# Patient Record
Sex: FEMALE | Race: WHITE | NOT HISPANIC OR LATINO | URBAN - METROPOLITAN AREA
[De-identification: names, ages, dates, MRNs, and addresses within clinical notes are randomized per-mention and may not be internally consistent; named-entity substitution may affect disease eponyms.]

---

## 2024-01-11 ENCOUNTER — OFFICE VISIT (OUTPATIENT)
Dept: OPHTHALMOLOGY | Facility: CLINIC | Age: 69
End: 2024-01-11
Payer: COMMERCIAL

## 2024-01-11 DIAGNOSIS — H52.4 PRESBYOPIA: ICD-10-CM

## 2024-01-11 DIAGNOSIS — H25.13 NUCLEAR SCLEROTIC CATARACT OF BOTH EYES: ICD-10-CM

## 2024-01-11 DIAGNOSIS — H52.13 MYOPIA OF BOTH EYES: Primary | ICD-10-CM

## 2024-01-11 DIAGNOSIS — H52.223 REGULAR ASTIGMATISM OF BOTH EYES: ICD-10-CM

## 2024-01-11 PROCEDURE — 92014 COMPRE OPH EXAM EST PT 1/>: CPT | Performed by: OPTOMETRIST

## 2024-01-11 PROCEDURE — 92015 DETERMINE REFRACTIVE STATE: CPT | Performed by: OPTOMETRIST

## 2024-01-11 PROCEDURE — FLVLF CONTACT LENS EVALUATION (SP): Performed by: OPTOMETRIST

## 2024-01-11 RX ORDER — LOSARTAN POTASSIUM 25 MG/1
25 TABLET ORAL 2 TIMES DAILY
COMMUNITY
Start: 2023-12-27

## 2024-01-11 RX ORDER — ROSUVASTATIN CALCIUM 20 MG/1
20 TABLET, COATED ORAL DAILY
COMMUNITY
Start: 2023-11-05

## 2024-01-11 ASSESSMENT — ENCOUNTER SYMPTOMS
MUSCULOSKELETAL NEGATIVE: 0
ALLERGIC/IMMUNOLOGIC NEGATIVE: 0
CONSTITUTIONAL NEGATIVE: 0
NEUROLOGICAL NEGATIVE: 0
ENDOCRINE NEGATIVE: 0
CARDIOVASCULAR NEGATIVE: 1
EYES NEGATIVE: 0
GASTROINTESTINAL NEGATIVE: 0
RESPIRATORY NEGATIVE: 0
PSYCHIATRIC NEGATIVE: 0
HEMATOLOGIC/LYMPHATIC NEGATIVE: 0

## 2024-01-11 ASSESSMENT — REFRACTION_MANIFEST
OD_SPHERE: -1.00
OD_CYLINDER: -0.50
OS_CYLINDER: -0.75
OS_SPHERE: -1.25
OD_AXIS: 100
OS_ADD: +2.50
OD_ADD: +2.50
OS_AXIS: 110

## 2024-01-11 ASSESSMENT — REFRACTION_WEARINGRX
OS_AXIS: 105
OS_SPHERE: -1.00
OD_SPHERE: -1.50
OD_AXIS: 095
OD_CYLINDER: -0.25
OS_CYLINDER: -0.75
OD_ADD: +2.50

## 2024-01-11 ASSESSMENT — CONF VISUAL FIELD
OD_NORMAL: 1
OS_NORMAL: 1
OS_INFERIOR_TEMPORAL_RESTRICTION: 0
METHOD: COUNTING FINGERS
OS_INFERIOR_NASAL_RESTRICTION: 0
OD_INFERIOR_TEMPORAL_RESTRICTION: 0
OD_INFERIOR_NASAL_RESTRICTION: 0
OS_SUPERIOR_NASAL_RESTRICTION: 0
OD_SUPERIOR_NASAL_RESTRICTION: 0
OS_SUPERIOR_TEMPORAL_RESTRICTION: 0
OD_SUPERIOR_TEMPORAL_RESTRICTION: 0

## 2024-01-11 ASSESSMENT — VISUAL ACUITY
OD_CC: 20/20
OS_CC: CF
VA_OR_OD_CURRENT_RX: 20/20
CORRECTION_TYPE: CONTACTS
VA_OR_OS_CURRENT_RX: 20/30
METHOD: SNELLEN - LINEAR
OS_CC: 20/25

## 2024-01-11 ASSESSMENT — TONOMETRY
OD_IOP_MMHG: 18
OS_IOP_MMHG: 18
IOP_METHOD: GOLDMANN APPLANATION

## 2024-01-11 ASSESSMENT — SLIT LAMP EXAM - LIDS
COMMENTS: GOOD POSITION
COMMENTS: GOOD POSITION

## 2024-01-11 ASSESSMENT — REFRACTION_CURRENTRX
OS_BASECURVE: 8.5
OS_SPHERE: +0.50
OS_BRAND: DAILIES TOTAL 1
OD_BASECURVE: 8.5
OD_BRAND: DAILIES TOTAL 1
OS_CYLINDER: SPHERE
OD_SPHERE: -1.50
OD_CYLINDER: SPHERE
OD_DIAMETER: 14.1
OS_DIAMETER: 14.1

## 2024-01-11 ASSESSMENT — CUP TO DISC RATIO
OD_RATIO: .3
OS_RATIO: .3

## 2024-01-11 ASSESSMENT — EXTERNAL EXAM - LEFT EYE: OS_EXAM: NORMAL

## 2024-01-11 ASSESSMENT — EXTERNAL EXAM - RIGHT EYE: OD_EXAM: NORMAL

## 2024-01-11 NOTE — PROGRESS NOTES
Assessment/Plan   Diagnoses and all orders for this visit:  Myopia of both eyes  Presbyopia  Regular astigmatism of both eyes  New spec rx released today per patient request. Ocular health wnl for age OU. Monitor 1 year or sooner prn. Refraction billed today. Discussed proper wear, care, replacement of contact lenses. Gave handout. D/c cl wear and RTC if eyes become red, painful, irritated. Monitor 1 year.   CL eval billed today.  $35  Baseline OCT RNFL at next visit.     Nuclear sclerotic cataract of both eyes  Patient's cataracts are not visually significant. Will monitor for changes. No indication for surgery at this time.

## 2025-05-02 ENCOUNTER — APPOINTMENT (OUTPATIENT)
Dept: OPHTHALMOLOGY | Facility: CLINIC | Age: 70
End: 2025-05-02
Payer: COMMERCIAL

## 2025-05-02 DIAGNOSIS — Z83.511 FAMILY HISTORY OF GLAUCOMA: ICD-10-CM

## 2025-05-02 DIAGNOSIS — H25.13 NUCLEAR SCLEROTIC CATARACT OF BOTH EYES: ICD-10-CM

## 2025-05-02 DIAGNOSIS — H52.4 PRESBYOPIA: ICD-10-CM

## 2025-05-02 DIAGNOSIS — H43.812 PVD (POSTERIOR VITREOUS DETACHMENT), LEFT EYE: ICD-10-CM

## 2025-05-02 DIAGNOSIS — Z83.518 FAMILY HISTORY OF RETINAL DETACHMENT: ICD-10-CM

## 2025-05-02 DIAGNOSIS — H52.13 MYOPIA OF BOTH EYES: Primary | ICD-10-CM

## 2025-05-02 DIAGNOSIS — H52.223 REGULAR ASTIGMATISM OF BOTH EYES: ICD-10-CM

## 2025-05-02 PROBLEM — E66.812 OBESITY, CLASS II, BMI 35-39.9: Status: ACTIVE | Noted: 2025-03-14

## 2025-05-02 PROBLEM — N18.31 STAGE 3A CHRONIC KIDNEY DISEASE (CKD) (MULTI): Status: ACTIVE | Noted: 2025-03-14

## 2025-05-02 PROBLEM — I10 HTN (HYPERTENSION): Status: ACTIVE | Noted: 2025-05-02

## 2025-05-02 PROBLEM — H25.813 COMBINED FORM OF SENILE CATARACT OF BOTH EYES: Status: ACTIVE | Noted: 2025-05-02

## 2025-05-02 PROBLEM — E78.5 HLD (HYPERLIPIDEMIA): Status: ACTIVE | Noted: 2025-05-02

## 2025-05-02 ASSESSMENT — REFRACTION_CURRENTRX
OS_CYLINDER: SPHERE
OD_SPHERE: -1.50
OS_BRAND: MYDAY ENERGYS
OD_CYLINDER: SPHERE
OS_BRAND: DAILIES TOTAL 1
OS_DIAMETER: 14.1
OS_SPHERE: +0.50
OD_DIAMETER: 14.1
OS_BASECURVE: 8.6
OS_DIAMETER: 14.2
OS_BRAND: INFUSE
OS_BASECURVE: 8.4
OD_CYLINDER: SPHERE
OD_BASECURVE: 8.5
OD_BRAND: MYDAY ENERGYS
OS_DIAMETER: 14.2
OS_SPHERE: +0.50
OD_BASECURVE: 8.6
OD_DIAMETER: 14.2
OS_SPHERE: +0.50
OD_DIAMETER: 14.2
OD_SPHERE: -1.50
OD_BRAND: DAILIES TOTAL 1
OS_CYLINDER: SPHERE
OD_BASECURVE: 8.4
OD_BRAND: INFUSE
OS_BASECURVE: 8.5
OD_CYLINDER: SPHERE
OD_SPHERE: -1.50
OS_CYLINDER: SPHERE

## 2025-05-02 ASSESSMENT — REFRACTION
OD_AXIS: 010
OS_AXIS: 020
OD_SPHERE: -1.75
OS_CYLINDER: +1.00
OD_CYLINDER: +0.50
OS_ADD: +2.50
OD_ADD: +2.50
OS_SPHERE: -2.25

## 2025-05-02 ASSESSMENT — EXTERNAL EXAM - RIGHT EYE: OD_EXAM: NORMAL

## 2025-05-02 ASSESSMENT — VISUAL ACUITY
OS_PH_CC: 20/30
VA_OR_OD_CURRENT_RX: 20/20
OD_CC: 20/25
CORRECTION_TYPE: CONTACTS
VA_OR_OD_CURRENT_RX: 20/20
OS_CC: 20/250
METHOD: SNELLEN - LINEAR
VA_OR_OD_CURRENT_RX: 20/20

## 2025-05-02 ASSESSMENT — REFRACTION_MANIFEST
OS_CYLINDER: +1.00
OD_ADD: +2.50
OD_CYLINDER: +0.50
OD_SPHERE: -1.75
OS_AXIS: 020
OD_AXIS: 010
OS_ADD: +2.50
OS_SPHERE: -2.25

## 2025-05-02 ASSESSMENT — CONF VISUAL FIELD
OD_SUPERIOR_NASAL_RESTRICTION: 0
OS_SUPERIOR_TEMPORAL_RESTRICTION: 0
OD_INFERIOR_NASAL_RESTRICTION: 0
OD_NORMAL: 1
OD_INFERIOR_TEMPORAL_RESTRICTION: 0
OS_INFERIOR_NASAL_RESTRICTION: 0
OS_INFERIOR_TEMPORAL_RESTRICTION: 0
OS_SUPERIOR_NASAL_RESTRICTION: 0
OS_NORMAL: 1
METHOD: COUNTING FINGERS
OD_SUPERIOR_TEMPORAL_RESTRICTION: 0

## 2025-05-02 ASSESSMENT — ENCOUNTER SYMPTOMS
CONSTITUTIONAL NEGATIVE: 0
RESPIRATORY NEGATIVE: 0
ALLERGIC/IMMUNOLOGIC NEGATIVE: 0
NEUROLOGICAL NEGATIVE: 0
EYES NEGATIVE: 0
CARDIOVASCULAR NEGATIVE: 0
PSYCHIATRIC NEGATIVE: 0
GASTROINTESTINAL NEGATIVE: 0
ENDOCRINE NEGATIVE: 0
HEMATOLOGIC/LYMPHATIC NEGATIVE: 0
MUSCULOSKELETAL NEGATIVE: 0

## 2025-05-02 ASSESSMENT — CUP TO DISC RATIO
OD_RATIO: .35
OS_RATIO: .3

## 2025-05-02 ASSESSMENT — SLIT LAMP EXAM - LIDS
COMMENTS: GOOD POSITION
COMMENTS: GOOD POSITION

## 2025-05-02 ASSESSMENT — EXTERNAL EXAM - LEFT EYE: OS_EXAM: NORMAL

## 2025-05-02 ASSESSMENT — TONOMETRY
IOP_METHOD: GOLDMANN APPLANATION
OS_IOP_MMHG: 15
OD_IOP_MMHG: 14

## 2025-05-02 NOTE — PROGRESS NOTES
Assessment/Plan   Diagnoses and all orders for this visit:  Myopia of both eyes  Regular astigmatism of both eyes  Presbyopia  New spec rx released today per patient request. Ocular health wnl for age OU. Monitor 1 year or sooner prn. Refraction billed today. Pt consents to receiving glasses Rx today. Patient's/guardian's signature obtained to acknowledge and confirm that a paper copy of glasses Rx was given to patient in compliance with [facility] Eyeglass Rule. Electronic copy of Rx will also be available via beqom/EPIC.   Discussed proper wear, care, replacement of contact lenses. Gave handout. D/c cl wear and RTC if eyes become red, painful, irritated. Monitor 1 year.   CL eval billed today. $35  Trials for myday energys and infuse given.     Nuclear sclerotic cataract of both eyes  Patient's cataracts are not visually significant. Will monitor for changes. No indication for surgery at this time.    Family history of retinal detachment  PVD (posterior vitreous detachment), left eye  Discussed signs and symtpoms of retinal hole, tear, detachment. Patient educated that retinal detachment can lead to permanent vision loss. Patient consents to return if they notice new floaters, flashes, curtain or veil covering vision.

## 2026-05-07 ENCOUNTER — APPOINTMENT (OUTPATIENT)
Dept: OPHTHALMOLOGY | Facility: CLINIC | Age: 71
End: 2026-05-07
Payer: MEDICARE